# Patient Record
Sex: FEMALE | Race: ASIAN | URBAN - METROPOLITAN AREA
[De-identification: names, ages, dates, MRNs, and addresses within clinical notes are randomized per-mention and may not be internally consistent; named-entity substitution may affect disease eponyms.]

---

## 2022-02-24 ENCOUNTER — OFFICE VISIT (OUTPATIENT)
Dept: URBAN - METROPOLITAN AREA CLINIC 98 | Facility: CLINIC | Age: 52
End: 2022-02-24
Payer: COMMERCIAL

## 2022-02-24 ENCOUNTER — WEB ENCOUNTER (OUTPATIENT)
Dept: URBAN - METROPOLITAN AREA CLINIC 98 | Facility: CLINIC | Age: 52
End: 2022-02-24

## 2022-02-24 DIAGNOSIS — K64.4 EXTERNAL HEMORRHOID, BLEEDING: ICD-10-CM

## 2022-02-24 DIAGNOSIS — R19.4 CHANGE IN BOWEL HABITS: ICD-10-CM

## 2022-02-24 DIAGNOSIS — R12 HEARTBURN: ICD-10-CM

## 2022-02-24 PROBLEM — 2901004 MELENA: Status: ACTIVE | Noted: 2022-02-24

## 2022-02-24 PROCEDURE — 99204 OFFICE O/P NEW MOD 45 MIN: CPT | Performed by: INTERNAL MEDICINE

## 2022-02-24 RX ORDER — ATORVASTATIN CALCIUM 40 MG/1
1 TABLET TABLET, FILM COATED ORAL ONCE A DAY
Status: ACTIVE | COMMUNITY

## 2022-02-24 NOTE — EXAM-FUNCTIONAL ASSESSMENT
Constitutional: well-developed, normal communication ability.   Eyes: Conjunctivae and eyelids appear normal, no scleral icterus. Respiratory:  symmetric expansion of chest wall, normal work of breathing   Gastrointestinal:  normoactive bowel sounds, soft, no tenderness, no rebound tenderness, no shifting dullness, no organomegaly, Rectal exam- large external hemorrhoids, no blood in the stool, normal maneuvers. Chaperone present.   Musculoskeletal: normal gait and station, no tenderness present.   Skin: No jaundice   Neurologic: Oriented to person, place, time. Short term memory intact.    Psychiatric: Normal mood and appropriate affect.

## 2022-02-24 NOTE — HPI-TODAY'S VISIT:
Ms. Souza is a 52 yo F presenting for new patient visit. Last week she had bright red blood per rectum when she had a bowel movement. This has happened a few times since last week. It can be clots. It is on the toilet paper. It only happens when she has a bowel movement. The bleeding has since stopped. She has had bleeding from hemorrhoids in the past, including last year. She has no anorectal pain at this time. She has no nausea or vomiting. She has not been treated for hemorrhoids recently.  She has chronic heartburn. She is not taking any medication for this at this time. She has heartburn 1-2 days per week, mostly after having caffeine. No dysphagia or odynophagia.   She sometimes has loose stools when she is stressed and when she eats outside of her house. She will have 1 day of loose stools per week at most and may have loose stools up to 1-2 times per day.   She had EGD and colonoscopy in 2019 and was diagnosed with H pylori. She was treated and was found to be cured. She had 2-3 polyps removed. She was told to repeat the colonoscopy in 5 years.   No NSAID use

## 2022-03-23 ENCOUNTER — WEB ENCOUNTER (OUTPATIENT)
Dept: URBAN - METROPOLITAN AREA CLINIC 98 | Facility: CLINIC | Age: 52
End: 2022-03-23

## 2022-03-23 ENCOUNTER — OFFICE VISIT (OUTPATIENT)
Dept: URBAN - METROPOLITAN AREA CLINIC 98 | Facility: CLINIC | Age: 52
End: 2022-03-23
Payer: COMMERCIAL

## 2022-03-23 VITALS
HEIGHT: 64 IN | WEIGHT: 174 LBS | HEART RATE: 71 BPM | DIASTOLIC BLOOD PRESSURE: 67 MMHG | BODY MASS INDEX: 29.71 KG/M2 | TEMPERATURE: 98.1 F | SYSTOLIC BLOOD PRESSURE: 131 MMHG

## 2022-03-23 DIAGNOSIS — R19.4 CHANGE IN BOWEL HABITS: ICD-10-CM

## 2022-03-23 DIAGNOSIS — K64.4 EXTERNAL HEMORRHOID, BLEEDING: ICD-10-CM

## 2022-03-23 DIAGNOSIS — R12 HEARTBURN: ICD-10-CM

## 2022-03-23 PROCEDURE — 99213 OFFICE O/P EST LOW 20 MIN: CPT | Performed by: INTERNAL MEDICINE

## 2022-03-23 RX ORDER — ATORVASTATIN CALCIUM 40 MG/1
1 TABLET TABLET, FILM COATED ORAL ONCE A DAY
Status: ACTIVE | COMMUNITY

## 2022-03-23 NOTE — HPI-TODAY'S VISIT:
Ms. Souza is a 52 yo F presenting for followup for hemorrhoids and heartburn.  She is having a BM every other day. She usually has solid stools. A few days per month she will have loose stools up to 3 times per day. She says stress brings this on. Dairy also makes her have loose stools.   Seeing colorectal surgeon and will have surgery.   She has not had any blood in the stool recently.   She is having heartburn after eating certain foods 2-3 times per week. She thinks caffeine makes it worse. No dysphagia.   2/24/22 visit:  Last week she had bright red blood per rectum when she had a bowel movement. This has happened a few times since last week. It can be clots. It is on the toilet paper. It only happens when she has a bowel movement. The bleeding has since stopped. She has had bleeding from hemorrhoids in the past, including last year. She has no anorectal pain at this time. She has no nausea or vomiting. She has not been treated for hemorrhoids recently.  She has chronic heartburn. She is not taking any medication for this at this time. She has heartburn 1-2 days per week, mostly after having caffeine. No dysphagia or odynophagia.   She sometimes has loose stools when she is stressed and when she eats outside of her house. She will have 1 day of loose stools per week at most and may have loose stools up to 1-2 times per day.   She had EGD and colonoscopy in 2019 and was diagnosed with H pylori. She was treated and was found to be cured. She had 2-3 polyps removed. She was told to repeat the colonoscopy in 5 years.   No NSAID use

## 2022-08-19 ENCOUNTER — OFFICE VISIT (OUTPATIENT)
Dept: URBAN - METROPOLITAN AREA CLINIC 42 | Facility: CLINIC | Age: 52
End: 2022-08-19
Payer: COMMERCIAL

## 2022-08-19 ENCOUNTER — DASHBOARD ENCOUNTERS (OUTPATIENT)
Age: 52
End: 2022-08-19

## 2022-08-19 VITALS
TEMPERATURE: 97.5 F | HEART RATE: 74 BPM | BODY MASS INDEX: 29.37 KG/M2 | SYSTOLIC BLOOD PRESSURE: 128 MMHG | WEIGHT: 172 LBS | DIASTOLIC BLOOD PRESSURE: 69 MMHG | RESPIRATION RATE: 20 BRPM | HEIGHT: 64 IN

## 2022-08-19 DIAGNOSIS — Z86.19 HISTORY OF HELICOBACTER PYLORI INFECTION: ICD-10-CM

## 2022-08-19 DIAGNOSIS — K21.9 GASTROESOPHAGEAL REFLUX DISEASE, UNSPECIFIED WHETHER ESOPHAGITIS PRESENT: ICD-10-CM

## 2022-08-19 DIAGNOSIS — Z86.010 HISTORY OF COLON POLYPS: ICD-10-CM

## 2022-08-19 PROCEDURE — 99244 OFF/OP CNSLTJ NEW/EST MOD 40: CPT | Performed by: INTERNAL MEDICINE

## 2022-08-19 PROCEDURE — 99214 OFFICE O/P EST MOD 30 MIN: CPT | Performed by: INTERNAL MEDICINE

## 2022-08-19 RX ORDER — ATORVASTATIN CALCIUM 40 MG/1
1 TABLET TABLET, FILM COATED ORAL ONCE A DAY
Status: ACTIVE | COMMUNITY

## 2022-08-19 RX ORDER — OMEPRAZOLE 20 MG/1
1 CAPSULE 30 MINUTES BEFORE MORNING MEAL CAPSULE, DELAYED RELEASE ORAL ONCE A DAY
Qty: 90 | Refills: 3 | OUTPATIENT
Start: 2022-08-19

## 2022-08-19 NOTE — HPI-TODAY'S VISIT:
The patient is referred by Dr. Jerod Rivera for EGD/colonoscopy.  Last had EGD/colonoscopy in 2018 with H pylori gastrititis seen and hiatal hernia.  She had a negative breath test in 2019 after treatment.  No new symptoms except for chronic GERD which she takes tums for.  The patient has been feeling fine except for some hemorrhoids.  She does have rare intermittent hemorrhoidal bleeding.

## 2022-09-13 ENCOUNTER — OFFICE VISIT (OUTPATIENT)
Dept: URBAN - METROPOLITAN AREA SURGERY CENTER 13 | Facility: SURGERY CENTER | Age: 52
End: 2022-09-13

## 2022-11-07 ENCOUNTER — TELEPHONE ENCOUNTER (OUTPATIENT)
Dept: URBAN - METROPOLITAN AREA CLINIC 42 | Facility: CLINIC | Age: 52
End: 2022-11-07

## 2022-11-09 PROBLEM — 428283002: Status: ACTIVE | Noted: 2022-08-19

## 2022-11-09 PROBLEM — 235595009: Status: ACTIVE | Noted: 2022-08-19

## 2022-11-17 ENCOUNTER — OFFICE VISIT (OUTPATIENT)
Dept: URBAN - METROPOLITAN AREA MEDICAL CENTER 24 | Facility: MEDICAL CENTER | Age: 52
End: 2022-11-17
Payer: COMMERCIAL

## 2022-11-17 DIAGNOSIS — K31.89 ACQUIRED DEFORMITY OF DUODENUM: ICD-10-CM

## 2022-11-17 DIAGNOSIS — R19.7 ACUTE DIARRHEA: ICD-10-CM

## 2022-11-17 DIAGNOSIS — R05.3 CHRONIC COUGH: ICD-10-CM

## 2022-11-17 PROCEDURE — 45380 COLONOSCOPY AND BIOPSY: CPT | Performed by: INTERNAL MEDICINE

## 2022-11-17 PROCEDURE — 43239 EGD BIOPSY SINGLE/MULTIPLE: CPT | Performed by: INTERNAL MEDICINE

## 2022-11-17 RX ORDER — ATORVASTATIN CALCIUM 40 MG/1
1 TABLET TABLET, FILM COATED ORAL ONCE A DAY
Status: ACTIVE | COMMUNITY

## 2022-11-17 RX ORDER — OMEPRAZOLE 20 MG/1
1 CAPSULE 30 MINUTES BEFORE MORNING MEAL CAPSULE, DELAYED RELEASE ORAL ONCE A DAY
Qty: 90 | Refills: 3 | Status: ACTIVE | COMMUNITY
Start: 2022-08-19

## 2022-12-06 ENCOUNTER — OFFICE VISIT (OUTPATIENT)
Dept: URBAN - METROPOLITAN AREA SURGERY CENTER 13 | Facility: SURGERY CENTER | Age: 52
End: 2022-12-06

## 2022-12-21 ENCOUNTER — TELEPHONE ENCOUNTER (OUTPATIENT)
Dept: URBAN - METROPOLITAN AREA CLINIC 92 | Facility: CLINIC | Age: 52
End: 2022-12-21